# Patient Record
Sex: MALE | Race: ASIAN | NOT HISPANIC OR LATINO | Employment: FULL TIME | URBAN - METROPOLITAN AREA
[De-identification: names, ages, dates, MRNs, and addresses within clinical notes are randomized per-mention and may not be internally consistent; named-entity substitution may affect disease eponyms.]

---

## 2023-12-28 VITALS
RESPIRATION RATE: 16 BRPM | HEART RATE: 103 BPM | BODY MASS INDEX: 25.11 KG/M2 | DIASTOLIC BLOOD PRESSURE: 75 MMHG | TEMPERATURE: 98.8 F | WEIGHT: 160 LBS | OXYGEN SATURATION: 99 % | HEIGHT: 67 IN | SYSTOLIC BLOOD PRESSURE: 151 MMHG

## 2023-12-28 PROBLEM — E78.2 MIXED HYPERLIPIDEMIA: Status: ACTIVE | Noted: 2023-12-28

## 2023-12-28 PROBLEM — E55.9 VITAMIN D INSUFFICIENCY: Status: ACTIVE | Noted: 2020-08-04

## 2023-12-28 LAB
ANION GAP SERPL CALCULATED.3IONS-SCNC: 9 MMOL/L (ref 7–15)
BASOPHILS # BLD AUTO: 0.1 10E3/UL (ref 0–0.2)
BASOPHILS NFR BLD AUTO: 1 %
BUN SERPL-MCNC: 10.5 MG/DL (ref 6–20)
CALCIUM SERPL-MCNC: 10.1 MG/DL (ref 8.6–10)
CHLORIDE SERPL-SCNC: 100 MMOL/L (ref 98–107)
CREAT SERPL-MCNC: 1.19 MG/DL (ref 0.67–1.17)
CRP SERPL-MCNC: 55.3 MG/L
DEPRECATED HCO3 PLAS-SCNC: 27 MMOL/L (ref 22–29)
EGFRCR SERPLBLD CKD-EPI 2021: 81 ML/MIN/1.73M2
EOSINOPHIL # BLD AUTO: 0 10E3/UL (ref 0–0.7)
EOSINOPHIL NFR BLD AUTO: 0 %
ERYTHROCYTE [DISTWIDTH] IN BLOOD BY AUTOMATED COUNT: 12.9 % (ref 10–15)
GLUCOSE SERPL-MCNC: 116 MG/DL (ref 70–99)
HCT VFR BLD AUTO: 44 % (ref 40–53)
HGB BLD-MCNC: 15.1 G/DL (ref 13.3–17.7)
IMM GRANULOCYTES # BLD: 0.1 10E3/UL
IMM GRANULOCYTES NFR BLD: 0 %
LYMPHOCYTES # BLD AUTO: 2 10E3/UL (ref 0.8–5.3)
LYMPHOCYTES NFR BLD AUTO: 17 %
MCH RBC QN AUTO: 27.8 PG (ref 26.5–33)
MCHC RBC AUTO-ENTMCNC: 34.3 G/DL (ref 31.5–36.5)
MCV RBC AUTO: 81 FL (ref 78–100)
MONOCYTES # BLD AUTO: 1.2 10E3/UL (ref 0–1.3)
MONOCYTES NFR BLD AUTO: 10 %
NEUTROPHILS # BLD AUTO: 8.6 10E3/UL (ref 1.6–8.3)
NEUTROPHILS NFR BLD AUTO: 72 %
NRBC # BLD AUTO: 0 10E3/UL
NRBC BLD AUTO-RTO: 0 /100
PLATELET # BLD AUTO: 347 10E3/UL (ref 150–450)
POTASSIUM SERPL-SCNC: 4.1 MMOL/L (ref 3.4–5.3)
RBC # BLD AUTO: 5.44 10E6/UL (ref 4.4–5.9)
SODIUM SERPL-SCNC: 136 MMOL/L (ref 135–145)
TSH SERPL DL<=0.005 MIU/L-ACNC: 0.25 UIU/ML (ref 0.3–4.2)
WBC # BLD AUTO: 11.8 10E3/UL (ref 4–11)

## 2023-12-28 PROCEDURE — 86140 C-REACTIVE PROTEIN: CPT | Performed by: STUDENT IN AN ORGANIZED HEALTH CARE EDUCATION/TRAINING PROGRAM

## 2023-12-28 PROCEDURE — 36415 COLL VENOUS BLD VENIPUNCTURE: CPT | Performed by: STUDENT IN AN ORGANIZED HEALTH CARE EDUCATION/TRAINING PROGRAM

## 2023-12-28 PROCEDURE — 84439 ASSAY OF FREE THYROXINE: CPT | Performed by: STUDENT IN AN ORGANIZED HEALTH CARE EDUCATION/TRAINING PROGRAM

## 2023-12-28 PROCEDURE — 99284 EMERGENCY DEPT VISIT MOD MDM: CPT | Mod: 25

## 2023-12-28 PROCEDURE — 80048 BASIC METABOLIC PNL TOTAL CA: CPT | Performed by: STUDENT IN AN ORGANIZED HEALTH CARE EDUCATION/TRAINING PROGRAM

## 2023-12-28 PROCEDURE — 84443 ASSAY THYROID STIM HORMONE: CPT | Performed by: STUDENT IN AN ORGANIZED HEALTH CARE EDUCATION/TRAINING PROGRAM

## 2023-12-28 PROCEDURE — 86308 HETEROPHILE ANTIBODY SCREEN: CPT | Performed by: EMERGENCY MEDICINE

## 2023-12-28 PROCEDURE — 85025 COMPLETE CBC W/AUTO DIFF WBC: CPT | Performed by: STUDENT IN AN ORGANIZED HEALTH CARE EDUCATION/TRAINING PROGRAM

## 2023-12-29 ENCOUNTER — HOSPITAL ENCOUNTER (EMERGENCY)
Facility: CLINIC | Age: 38
Discharge: HOME | End: 2023-12-29
Attending: EMERGENCY MEDICINE | Admitting: EMERGENCY MEDICINE
Payer: COMMERCIAL

## 2023-12-29 ENCOUNTER — APPOINTMENT (OUTPATIENT)
Dept: ULTRASOUND IMAGING | Facility: CLINIC | Age: 38
End: 2023-12-29
Attending: EMERGENCY MEDICINE
Payer: COMMERCIAL

## 2023-12-29 DIAGNOSIS — E04.9 THYROID ENLARGEMENT: ICD-10-CM

## 2023-12-29 LAB
FLUAV RNA SPEC QL NAA+PROBE: NEGATIVE
FLUBV RNA RESP QL NAA+PROBE: NEGATIVE
MONOCYTES NFR BLD AUTO: NEGATIVE %
RSV RNA SPEC NAA+PROBE: NEGATIVE
SARS-COV-2 RNA RESP QL NAA+PROBE: NEGATIVE
T4 FREE SERPL-MCNC: 1.64 NG/DL (ref 0.9–1.7)

## 2023-12-29 PROCEDURE — 76536 US EXAM OF HEAD AND NECK: CPT

## 2023-12-29 PROCEDURE — 87637 SARSCOV2&INF A&B&RSV AMP PRB: CPT | Performed by: EMERGENCY MEDICINE

## 2023-12-29 NOTE — ED PROVIDER NOTES
EMERGENCY DEPARTMENT ENCOUNTER      NAME: Davide Delcid  AGE: 38 year old male  YOB: 1985  MRN: [id number]  EVALUATION DATE & TIME: No admission date for patient encounter.    PCP: Shonda Rincon    ED PROVIDER: Alberto Negrete M.D.      Chief Complaint   Patient presents with    Neck Pain                FINAL IMPRESSION:  1. Thyroid enlargement          ED COURSE & MEDICAL DECISION MAKING:    Pertinent Labs & Imaging studies reviewed below.  All EKGs below represent my independent interpretation.   ED Course as of 12/29/23 0634   Thu Dec 28, 2023   2346 At least 7 days of of left lump on neck. Now tender.  Temperature of 99.1 here with a blood pressure of 151, heart rate of 103.  He is comfortable appearing, has minimal tenderness over the area.  Denies any recent illness.  Sent in from urgent care after concern for infectious thyroiditis on hearing patient's story and gathering vitals.   Fri Dec 29, 2023   0135 Mononucleosis Screen: Negative   0220 US Thyroid  1.  Large heterogeneous thyroid gland. No abnormal mass or fluid collection in the neck.   Lab work here is quite reassuring, although his CRP is up at 55.3 and his white blood cell count is 11.8, his free T4 is normal at 1.64.  Negative viral panel including mononucleosis.    I spoke to endocrinology who recommends ultrasound of the thyroid.  This showed large heterogenous thyroid gland.  There was no lymphadenopathy or other fluid collection in the neck.  I suspect his symptoms are due to the monitoring thyroid, unclear if this is autoimmune or viral, but no indications for antibiotics at this time.  Endocrinology's recommendations will be for outpatient follow-up and I placed an urgent referral.  I also instructed patient to follow-up with his primary care doctor over the next week for recheck of lab work and symptoms if he cannot get food and to be seen by endocrinology within the next couple of weeks.  We discussed signs and  symptoms of prompt return to the emergency department.      Additional ED Course Timestamps:  12:03 AM I met with the patient to gather history and perform my exam. ED course and treatment discussed. Patient seen in [facility] due to critical capacity and boarding crisis leaving no ED rooms available.   2:29 AM I updated the patient with lab and imaging results and discussed the plan for discharge.       Medical Decision Making    History:  Supplemental history from: Documented in chart, if applicable  External Record(s) reviewed: Documented in chart, if applicable.    Work Up:  Chart documentation includes differential considered and any EKGs or imaging independently interpreted by provider, where specified.  In additional to work up documented, I considered the following work up: Documented in chart, if applicable.    External consultation:  Discussion of management with another provider: Documented in chart, if applicable    Complicating factors:  Care impacted by chronic illness: N/A  Care affected by social determinants of health: N/A    Disposition considerations: Discharge. No recommendations on prescription strength medication(s). N/A.        At the conclusion of the encounter I discussed the results of all of the tests and the disposition. The questions were answered. The patient or family acknowledged understanding and was agreeable with the care plan.         MEDICATIONS GIVEN IN THE EMERGENCY:  Medications - No data to display      NEW PRESCRIPTIONS STARTED AT TODAY'S ER VISIT  There are no discharge medications for this patient.         =================================================================    HPI        Davide Delcdi is a 38 year old male with a pertinent history of n/a who presents to this ED for evaluation of lump on the left side of his neck, sent from urgent care.  Symptoms have been present for about 7 days, more noticeable today and a little bit of swelling noted to the right side of  "the lower neck.  No fever above 100, but slight elevation at 99 earlier today.  No unexpected weight loss.  No painful swallowing.               VITALS:  BP (!) 151/75   Pulse 103   Temp 98.8  F (37.1  C) (Oral)   Resp 16   Ht 1.702 m (5' 7\")   Wt 72.6 kg (160 lb)   SpO2 99%   BMI 25.06 kg/m      PHYSICAL EXAM    Constitutional: Well developed, well nourished. Comfortable appearing.  HENT: Normocephalic, atraumatic, mucous membranes moist, nose normal. Neck- mild tenderness to bump on left side of the neck.  Managing secretions.   Eyes: Pupils mid-range, conjunctiva without injection, no discharge.   Respiratory: Clear to auscultation bilaterally, no respiratory distress, no wheezing, speaks full sentences easily. No cough.  Cardiovascular: mildly tachycardic rate, regular rhythm, no murmurs.   GI: Soft, no tenderness to deep palpation in all quadrants, no masses.  Musculoskeletal: Moving all 4 extremities intentionally and without pain. No obvious deformity.  Skin: Warm, dry, no rash.  Neurologic: Alert & oriented x 3, cranial nerves grossly intact.  Psychiatric: Affect normal, cooperative.      I, Criss Amin am serving as a scribe to document services personally performed by Dr. Alberto Negrete based on my observation and the provider's statements to me. I, Alberto Negrete MD attest that Criss Amin is acting in a scribe capacity, has observed my performance of the services and has documented them in accordance with my direction.    Alberto Negrete M.D.  Emergency Medicine  [facility] EMERGENCY ROOM  [address] [address]  [phone]  Dept: [phone]       Alberto Negrete MD  12/29/23 0641    "

## 2023-12-29 NOTE — PROGRESS NOTES
Informal Recommendations Note    I was called by Dr. crane on 12/28/23 at 00:05  AM to provide input for Davide Delcid. The nature of this request for input does not permit comprehensive review of health records or patient interview.  I was not requested or am not able to personally examine the patient at this time.    Davide is a 38 year old male who presented to the emergency department for symptoms of mild neck pain and lump on his neck.  He was referred from PCP clinic for concerns of infectious thyroiditis.  In the emergency department patient's lab work showed TSH suppressed at 0.25 with normal free T4 of 1.64.  Also had elevated CRP at 55.30.  He was mildly tachycardic at 103.  No other hyperthyroid symptoms reported from ED signout.    Based on the information provided, my recommendations are as follows:    Discussed with ED provider.  My differential diagnoses are subacute thyroiditis (viral/idiopathic) versus infectious thyroiditis.  -Recommended getting ultrasound thyroid to rule out any thyroid abscess.  -Recommended NSAIDs for pain control.  If ultrasound negative for thyroid ultrasound shows features for thyroiditis, can consider prednisone as outpatient.  -TSH is suppressed and free T4 is currently normal but can progress to overt hyperthyroidism.  Recommended follow-up thyroid labs with PCP in a week.  Also recommended placing urgent endocrinology referral for further evaluation including radioactive iodine uptake scan and checking for TPO and TSI antibodies.  -Recommended using beta-blocker for heart rate control if needed.    These recommendations are not intended to take the place of the care team's clinical judgement, which should always be utilized to provide the most appropriate care to meet the unique needs of each patient.     Carlos Panchal MD   PGY-4  Endocrinology fellow

## 2023-12-29 NOTE — DISCHARGE INSTRUCTIONS
Thyroid gland is enlarged, but no sign of a abscess or bacterial infection.  Your thyroid levels are still normal, but it is important for follow-up with an endocrinologist.  They can recheck levels in clinic.  I placed a referral and somebody will call you within the next few business days to set up an appointment.    It may take more than a week to be seen by endocrinology, and I like you to be seen by your primary care doctor next week for reevaluation.  They can recheck your thyroid tests, check your vital signs and see how you are feeling.

## 2024-01-03 ENCOUNTER — TELEPHONE (OUTPATIENT)
Dept: ENDOCRINOLOGY | Facility: CLINIC | Age: 39
End: 2024-01-03
Payer: COMMERCIAL

## 2024-01-05 NOTE — ED TRIAGE NOTES
Patient reports swollen lump on left side of neck x 3-4 days. Reports pain with palpation. Low grade fever. Patient was seen at  and advised to come to ED for evaluation of possible acute thyroiditis      Triage Assessment (Adult)       Row Name 12/28/23 9566          Triage Assessment    Airway WDL WDL        Respiratory WDL    Respiratory WDL WDL        Skin Circulation/Temperature WDL    Skin Circulation/Temperature WDL WDL        Cardiac WDL    Cardiac WDL WDL        Peripheral/Neurovascular WDL    Peripheral Neurovascular WDL WDL        Cognitive/Neuro/Behavioral WDL    Cognitive/Neuro/Behavioral WDL WDL                     
General

## 2024-01-12 ENCOUNTER — VIRTUAL VISIT (OUTPATIENT)
Dept: ENDOCRINOLOGY | Facility: CLINIC | Age: 39
End: 2024-01-12
Attending: EMERGENCY MEDICINE
Payer: COMMERCIAL

## 2024-01-12 DIAGNOSIS — R73.09 ELEVATED GLUCOSE: ICD-10-CM

## 2024-01-12 DIAGNOSIS — E05.90 HYPERTHYROIDISM: ICD-10-CM

## 2024-01-12 DIAGNOSIS — R79.89 LOW VITAMIN D LEVEL: Primary | ICD-10-CM

## 2024-01-12 DIAGNOSIS — E04.9 THYROID ENLARGEMENT: ICD-10-CM

## 2024-01-12 PROCEDURE — 99204 OFFICE O/P NEW MOD 45 MIN: CPT | Mod: 95 | Performed by: INTERNAL MEDICINE

## 2024-01-12 RX ORDER — OMEGA-3 FATTY ACIDS/FISH OIL 300-1000MG
200 CAPSULE ORAL EVERY 6 HOURS
COMMUNITY
End: 2024-03-01

## 2024-01-12 RX ORDER — AMPICILLIN TRIHYDRATE 500 MG
1000 CAPSULE ORAL
COMMUNITY
Start: 2022-12-01 | End: 2024-03-01

## 2024-01-12 ASSESSMENT — PAIN SCALES - GENERAL: PAINLEVEL: NO PAIN (0)

## 2024-01-12 NOTE — LETTER
1/12/2024         RE: Davide Delcid  [address] [place] [address]        Dear Colleague,    Thank you for referring your patient, Davide Delcid, to the [facility] [place]. Please see a copy of my visit note below.    Video-Visit Details    Type of service:  Video Visit  Video Start Time: 1304  Video End Time:1356  Originating Location (pt. Location): Home, [place]  Distant Location (provider location):  Home  Platform used for Video Visit: Mckenzie Peres MD    Endocrinology Clinic Visit 1/12/2024    NAME:  Davide Delcid  PCP:  Shonda Rincon  MRN:  [id number]  Reason for Consult:  thyrotoxicosis  Requesting Provider:  Alberto Negrete       HISTORY OF PRESENT ILLNESS  Davide Delcid (Lucille) is a 38 year old male who is here for initial evaluation and management of thyrotoxicosis    Patient presented to the ED and endocrine fellow was contacted 12/29/23.  Per Dr. Gonzalez note   In the emergency department patient's lab work showed TSH suppressed at 0.25 with normal free T4 of 1.64.  Also had elevated CRP at 55.30.  He was mildly tachycardic at 103.  No other hyperthyroid symptoms reported from ED signout.     Based on the information provided, my recommendations are as follows:    Discussed with ED provider.  My differential diagnoses are subacute thyroiditis (viral/idiopathic) versus infectious thyroiditis.  -Recommended getting ultrasound thyroid to rule out any thyroid abscess.  -Recommended NSAIDs for pain control.  If ultrasound negative for thyroid ultrasound shows features for thyroiditis, can consider prednisone as outpatient.  -TSH is suppressed and free T4 is currently normal but can progress to overt hyperthyroidism.  Recommended follow-up thyroid labs with PCP in a week.  Also recommended placing urgent endocrinology referral for further evaluation including radioactive iodine uptake scan and checking for TPO and TSI  antibodies.  -Recommended using beta-blocker for heart rate control if needed.    PCP 1/9/23  On exam: Tender palpable and visible thyroid lobes - not nodular     Pt reported  Uncle with hypothyroid   Parents with high BP    Pt has intermittent sinusitis  Left sided neck pain, around before Xmas, then progress to the right and fever, so went to the ED  Difficulty sleep  Currently pain subsided, only tender to touch 3/10. Fever improved  Notice more swelling after paying attention  No problem swallowing/ breathing/ voice change  More sensitive to hot and cold  Shivering, now is gone    Ongoing weight loss 10 lbs and mood swing,   BM is ok  No eye pain or vision change    BP normal  HR 80s-90s (normal 50s-60s), and >100 with exertion      REVIEW OF SYSTEMS  10 point negative except as mentioned in HPI    Past Medical/Surgical History:  none    Medications  Current Outpatient Medications   Medication     Acetaminophen 325 MG CAPS     Cholecalciferol (D 1000) 25 MCG (1000 UT) CAPS     ibuprofen (ADVIL/MOTRIN) 200 MG capsule     No current facility-administered medications for this visit.       Allergies  [place] tree (sneezing)      Family History  family history is not on file.    Social History  Social History     Tobacco Use     Smoking status: Never     Smokeless tobacco: Never   Substance Use Topics     Alcohol use: Not on file       Physical Exam  There were no vitals taken for this visit.  There is no height or weight on file to calculate BMI.  GENERAL :  In no apparent distress  EYES: No obvious proptosis  NECK: No obvious visible masses.   RESP: Normal breathing  NEURO: awake, alert, responds appropriately to questions.      DATA REVIEW  Labs/Imaging  TSH   Date Value Ref Range Status   12/28/2023 0.25 (L) 0.30 - 4.20 uIU/mL Final     Free T4   Date Value Ref Range Status   12/28/2023 1.64 0.90 - 1.70 ng/dL Final       I personally reviewed image.   EXAM: US THYROID  LOCATION: [facility]  DATE: 12/29/2023     INDICATION: L>R neck swelling, eval for mass  COMPARISON: None.  TECHNIQUE: Thyroid ultrasound.      FINDINGS:  RIGHT lobe: 5.6 x 2.0 x 2.0 cm. Heterogenous echotexture.  Isthmus: 1 mm.  LEFT lobe: 5.1 x 2.0 x 2.0 cm. Heterogenous echotexture.     NECK: No cervical lymphadenopathy.                                                                      IMPRESSION:  1.  Large heterogeneous thyroid gland. No abnormal mass or fluid collection in the neck.      ASSESSMENT/PLAN:   ## Thyrotoxicosis  Base on improvement in clinical, most likely thyroiditis (subacute thyroiditis).  Today we discussed natural history of thyroiditis, starts with thyrotoxicosis, resolves to normal thyroid function within 12-18 months, 5% possibility  of permanent hypothyroidism.   We discussed betablocker for symptomatic treatment (palpitation/ high heart rate/ shakiness/ irritability), patient prefers continue to monitor for now.  Discussed possible use of cholestyramine if ongoing thyrotoxicosis.  NSAIDS as needed for pain  -- labs  -- possible cholestyramine  -- 6 weeks follow up    Orders Placed This Encounter   Procedures     TSH with free T4 reflex     Thyroid stimulating immunoglobulin     Thyrotropin Receptor Antibody     Vitamin D Deficiency     Hemoglobin A1c          Quan Peres MD        Again, thank you for allowing me to participate in the care of your patient.        Sincerely,        Quan Peres MD

## 2024-01-12 NOTE — PROGRESS NOTES
"Virtual Visit Details    Type of service:  Video Visit     Originating Location (pt. Location): {video visit patient location:690667::\"Home\"}  {PROVIDER LOCATION On-site should be selected for visits conducted from your clinic location or adjoining [facility] hospital, academic office, or other nearby [facility] building. Off-site should be selected for all other provider locations, including home:[id number]}  Distant Location (provider location):  {virtual location provider:822460}  Platform used for Video Visit: {Virtual Visit Platforms:043636::\"shipbeat\"}  "

## 2024-01-12 NOTE — NURSING NOTE
Is the patient currently in the state of [place]? YES    Visit mode:VIDEO    If the visit is dropped, the patient can be reconnected by: VIDEO VISIT: Send to e-mail at: [email or web address]    Will anyone else be joining the visit? NO  (If patient encounters technical issues they should call [phone])    How would you like to obtain your AVS? MyChart    Are changes needed to the allergy or medication list? No    Reason for visit: Consult    Shelby Kocher VVF

## 2024-01-12 NOTE — PROGRESS NOTES
Video-Visit Details    Type of service:  Video Visit  Video Start Time: 1304  Video End Time:1356  Originating Location (pt. Location): [place], [place]  Distant Location (provider location):  Home  Platform used for Video Visit: Mckenzie Peres MD    Endocrinology Clinic Visit 1/12/2024    NAME:  Davide Delcid  PCP:  Shonda Rincon  MRN:  [id number]  Reason for Consult:  thyrotoxicosis  Requesting Provider:  Alberto Negrete       HISTORY OF PRESENT ILLNESS  Davide Delcid (Lucille) is a 38 year old male who is here for initial evaluation and management of thyrotoxicosis    Patient presented to the ED and endocrine fellow was contacted 12/29/23.  Per Dr. Gonzalez note   In the emergency department patient's lab work showed TSH suppressed at 0.25 with normal free T4 of 1.64.  Also had elevated CRP at 55.30.  He was mildly tachycardic at 103.  No other hyperthyroid symptoms reported from ED signout.     Based on the information provided, my recommendations are as follows:    Discussed with ED provider.  My differential diagnoses are subacute thyroiditis (viral/idiopathic) versus infectious thyroiditis.  -Recommended getting ultrasound thyroid to rule out any thyroid abscess.  -Recommended NSAIDs for pain control.  If ultrasound negative for thyroid ultrasound shows features for thyroiditis, can consider prednisone as outpatient.  -TSH is suppressed and free T4 is currently normal but can progress to overt hyperthyroidism.  Recommended follow-up thyroid labs with PCP in a week.  Also recommended placing urgent endocrinology referral for further evaluation including radioactive iodine uptake scan and checking for TPO and TSI antibodies.  -Recommended using beta-blocker for heart rate control if needed.    PCP 1/9/23  On exam: Tender palpable and visible thyroid lobes - not nodular     Pt reported  Uncle with hypothyroid   Parents with high BP    Pt has intermittent sinusitis  Left sided neck pain, around  before Xmas, then progress to the right and fever, so went to the ED  Difficulty sleep  Currently pain subsided, only tender to touch 3/10. Fever improved  Notice more swelling after paying attention  No problem swallowing/ breathing/ voice change  More sensitive to hot and cold  Shivering, now is gone    Ongoing weight loss 10 lbs and mood swing,   BM is ok  No eye pain or vision change    BP normal  HR 80s-90s (normal 50s-60s), and >100 with exertion      REVIEW OF SYSTEMS  10 point negative except as mentioned in HPI    Past Medical/Surgical History:  none    Medications  Current Outpatient Medications   Medication    Acetaminophen 325 MG CAPS    Cholecalciferol (D 1000) 25 MCG (1000 UT) CAPS    ibuprofen (ADVIL/MOTRIN) 200 MG capsule     No current facility-administered medications for this visit.       Allergies  [place] tree (sneezing)      Family History  family history is not on file.    Social History  Social History     Tobacco Use    Smoking status: Never    Smokeless tobacco: Never   Substance Use Topics    Alcohol use: Not on file       Physical Exam  There were no vitals taken for this visit.  There is no height or weight on file to calculate BMI.  GENERAL :  In no apparent distress  EYES: No obvious proptosis  NECK: No obvious visible masses.   RESP: Normal breathing  NEURO: awake, alert, responds appropriately to questions.      DATA REVIEW  Labs/Imaging  TSH   Date Value Ref Range Status   12/28/2023 0.25 (L) 0.30 - 4.20 uIU/mL Final     Free T4   Date Value Ref Range Status   12/28/2023 1.64 0.90 - 1.70 ng/dL Final       I personally reviewed image.   EXAM: US THYROID  LOCATION: [facility]  DATE: 12/29/2023     INDICATION: L>R neck swelling, eval for mass  COMPARISON: None.  TECHNIQUE: Thyroid ultrasound.      FINDINGS:  RIGHT lobe: 5.6 x 2.0 x 2.0 cm. Heterogenous echotexture.  Isthmus: 1 mm.  LEFT lobe: 5.1 x 2.0 x 2.0 cm. Heterogenous echotexture.     NECK: No cervical  lymphadenopathy.                                                                      IMPRESSION:  1.  Large heterogeneous thyroid gland. No abnormal mass or fluid collection in the neck.      ASSESSMENT/PLAN:   ## Thyrotoxicosis  Base on improvement in clinical, most likely thyroiditis (subacute thyroiditis).  Today we discussed natural history of thyroiditis, starts with thyrotoxicosis, resolves to normal thyroid function within 12-18 months, 5% possibility  of permanent hypothyroidism.   We discussed betablocker for symptomatic treatment (palpitation/ high heart rate/ shakiness/ irritability), patient prefers continue to monitor for now.  Discussed possible use of cholestyramine if ongoing thyrotoxicosis.  NSAIDS as needed for pain  -- labs  -- possible cholestyramine  -- 6 weeks follow up    Orders Placed This Encounter   Procedures    TSH with free T4 reflex    Thyroid stimulating immunoglobulin    Thyrotropin Receptor Antibody    Vitamin D Deficiency    Hemoglobin A1c          Quan Peres MD      01/31/24   Please let patient know that labs for Graves' antibodies are normal.  Recommend further evaluation of hyperthyroid with thyroid uptake scan.    Please schedule a sooner follow up 2-4 weeks.

## 2024-01-20 ENCOUNTER — LAB (OUTPATIENT)
Dept: LAB | Facility: CLINIC | Age: 39
End: 2024-01-20
Payer: COMMERCIAL

## 2024-01-20 DIAGNOSIS — E04.9 THYROID ENLARGEMENT: ICD-10-CM

## 2024-01-20 DIAGNOSIS — R73.09 ELEVATED GLUCOSE: ICD-10-CM

## 2024-01-20 DIAGNOSIS — R79.89 LOW VITAMIN D LEVEL: ICD-10-CM

## 2024-01-20 LAB
HBA1C MFR BLD: 5.7 % (ref 0–5.6)
T4 FREE SERPL-MCNC: 4.72 NG/DL (ref 0.9–1.7)
TSH SERPL DL<=0.005 MIU/L-ACNC: <0.01 UIU/ML (ref 0.3–4.2)
VIT D+METAB SERPL-MCNC: 21 NG/ML (ref 20–50)

## 2024-01-20 PROCEDURE — 83520 IMMUNOASSAY QUANT NOS NONAB: CPT | Mod: 90

## 2024-01-20 PROCEDURE — 82306 VITAMIN D 25 HYDROXY: CPT

## 2024-01-20 PROCEDURE — 99000 SPECIMEN HANDLING OFFICE-LAB: CPT

## 2024-01-20 PROCEDURE — 84439 ASSAY OF FREE THYROXINE: CPT

## 2024-01-20 PROCEDURE — 84445 ASSAY OF TSI GLOBULIN: CPT | Mod: 90

## 2024-01-20 PROCEDURE — 84443 ASSAY THYROID STIM HORMONE: CPT

## 2024-01-20 PROCEDURE — 83036 HEMOGLOBIN GLYCOSYLATED A1C: CPT

## 2024-01-20 PROCEDURE — 36415 COLL VENOUS BLD VENIPUNCTURE: CPT

## 2024-01-22 ENCOUNTER — MYC MEDICAL ADVICE (OUTPATIENT)
Dept: ENDOCRINOLOGY | Facility: CLINIC | Age: 39
End: 2024-01-22
Payer: COMMERCIAL

## 2024-01-22 DIAGNOSIS — E05.90 HYPERTHYROIDISM: Primary | ICD-10-CM

## 2024-01-22 LAB — TSH RECEP AB SER-ACNC: <1.1 IU/L (ref 0–1.75)

## 2024-01-22 RX ORDER — CHOLESTYRAMINE 4 G/9G
1 POWDER, FOR SUSPENSION ORAL
Qty: 90 PACKET | Refills: 0 | Status: SHIPPED | OUTPATIENT
Start: 2024-01-22 | End: 2024-03-01

## 2024-01-22 NOTE — ADDENDUM NOTE
"Dr. Merrill  Please review lab results drawn on 5/19/2022.  Pt message via [employer] with concern to results.  Do you recommend any f/u to this patient?    \"Hello!  I just got my test results. The numbers are high or very high. I m very worry about that. Many people on my dad s side passed away because of the stroke. And almost all people in my family have high blood pressure. I m wondering that it is genetics in my family?   I m trying to walk about 15 minutes a day. If I change my diet and do more exercise, will it help? I heard people said there is medication to reduce the cholesterol. Do I need to use it?      Thank you,      Timothy Crowell\"     Thank you    MIKE Singh, RN  [facility]    " Addended by: FRANCIS EDOUARD on: 1/22/2024 08:40 AM     Modules accepted: Orders

## 2024-01-22 NOTE — RESULT ENCOUNTER NOTE
Hello -    Here are my comments about the recent results. Thyroid function test shows elevated free T4. I sent cholestyramine (medication to help lower thyroid hormone level).  Graves' antibodies are pending.    A1c in prediabetes range, continue healthy lifestyle and will recheck in 3 months.    Please let us know if you have any questions or concerns.    Regards,  Quan Peres MD

## 2024-01-24 NOTE — RESULT ENCOUNTER NOTE
Hello -    Here are my comments about the recent results. One of two Graves' antibodies is normal. Pending TSI.    Please let us know if you have any questions or concerns.    Regards,  Quan Peres MD

## 2024-01-26 LAB — TSI SER-ACNC: <1 TSI INDEX

## 2024-01-31 NOTE — TELEPHONE ENCOUNTER
----- Message from Quan Peres MD sent at 1/31/2024  8:52 AM CST -----  Please let patient know that labs for Graves' antibodies are normal.  Recommend further evaluation of hyperthyroid with thyroid uptake scan.    Please schedule a sooner follow up 2-4 weeks.

## 2024-01-31 NOTE — RESULT ENCOUNTER NOTE
Please let patient know that labs for Graves' antibodies are normal.  Recommend further evaluation of hyperthyroid with thyroid uptake scan.    Please schedule a sooner follow up 2-4 weeks.

## 2024-02-27 NOTE — PROGRESS NOTES
Video-Visit Details    Type of service:  Video Visit  Video Start Time: 1428  Video End Time: 1439  Originating Location (pt. Location): Home, [place]  Distant Location (provider location):  Home  Platform used for Video Visit: Mckenzie Peres MD    Interval History  Weight is up 77 kg -->69-->73 kg  Overall feeling good  Pt has not had thyroid uptake and scan  Pt took cholestyramine about 3-4 times and stopped  Dizziness when change position  Never passed out    Initial visit  Patient presented to the ED and endocrine fellow was contacted 12/29/23.  Per Dr. Gonzalez note   In the emergency department patient's lab work showed TSH suppressed at 0.25 with normal free T4 of 1.64.  Also had elevated CRP at 55.30.  He was mildly tachycardic at 103.  No other hyperthyroid symptoms reported from ED signout.     Based on the information provided, my recommendations are as follows:    Discussed with ED provider.  My differential diagnoses are subacute thyroiditis (viral/idiopathic) versus infectious thyroiditis.  -Recommended getting ultrasound thyroid to rule out any thyroid abscess.  -Recommended NSAIDs for pain control.  If ultrasound negative for thyroid ultrasound shows features for thyroiditis, can consider prednisone as outpatient.  -TSH is suppressed and free T4 is currently normal but can progress to overt hyperthyroidism.  Recommended follow-up thyroid labs with PCP in a week.  Also recommended placing urgent endocrinology referral for further evaluation including radioactive iodine uptake scan and checking for TPO and TSI antibodies.  -Recommended using beta-blocker for heart rate control if needed.    PCP 1/9/23  On exam: Tender palpable and visible thyroid lobes - not nodular     Pt reported  Uncle with hypothyroid   Parents with high BP    Pt has intermittent sinusitis  Left sided neck pain, around before Xmas, then progress to the right and fever, so went to the ED  Difficulty sleep  Currently pain  subsided, only tender to touch 3/10. Fever improved  Notice more swelling after paying attention  No problem swallowing/ breathing/ voice change  More sensitive to hot and cold  Shivering, now is gone    Ongoing weight loss 10 lbs and mood swing,   BM is ok  No eye pain or vision change    BP normal  HR 80s-90s (normal 50s-60s), and >100 with exertion      REVIEW OF SYSTEMS  10 point negative except as mentioned in HPI    Past Medical/Surgical History:  none    Medications  No current outpatient medications on file.     No current facility-administered medications for this visit.       Allergies  [place] tree (sneezing)      Family History  family history is not on file.    Social History  Social History     Tobacco Use    Smoking status: Never    Smokeless tobacco: Never   Substance Use Topics    Alcohol use: Not on file       Physical Exam  There were no vitals taken for this visit.  There is no height or weight on file to calculate BMI.  GENERAL :  In no apparent distress  NEURO: awake, alert, responds appropriately to questions.        EXAM: US THYROID  LOCATION: [facility]  DATE: 12/29/2023     INDICATION: L>R neck swelling, eval for mass  COMPARISON: None.  TECHNIQUE: Thyroid ultrasound.      FINDINGS:  RIGHT lobe: 5.6 x 2.0 x 2.0 cm. Heterogenous echotexture.  Isthmus: 1 mm.  LEFT lobe: 5.1 x 2.0 x 2.0 cm. Heterogenous echotexture.     NECK: No cervical lymphadenopathy.                                                                      IMPRESSION:  1.  Large heterogeneous thyroid gland. No abnormal mass or fluid collection in the neck.       DATA REVIEW  Labs/Imaging  2/26/24  TSH 7.41  FT4 0.78 (0.93-1.7)     Latest Reference Range & Units 01/20/24 11:33   Thyrotropin Receptor Antibody 0.00 - 1.75 IU/L <1.10      Latest Reference Range & Units 01/20/24 11:33   Thyroid Stim Immunog <=1.3 TSI index <1.0       ASSESSMENT/PLAN:   ## Thyrotoxicosis--> hypothyroidism  ## Mild  heterogenous and enlarge thyroid  Base on improvement in clinical, US and lab pattern from overactive to underactive thyroid, most likely thyroiditis (subacute thyroiditis).  Ok to hold off on thyroid uptake and scan for now.  Thyroid US showed mildly enlarge thyroid and signs of thyroiditis (heterogenous)  Overall feeling well, we discuss chances of full recovery vs ongoing hypothyroidism  -- labs in 2 weeks at [facility]. Patient would like to recheck triglyceride, will do fasting lipid profile  -- regarding activities, hypothyroid can increase risk of muscle inflammation (myositis), recommend avoid vigorous exercise and only do light-mod exercise for now until thyroid function test is normal  -- Pre DM continue to follow up with primary    Follow-up to be determined    Orders Placed This Encounter   Procedures    TSH with free T4 reflex    Lipid Profile (Chol, Trig, HDL, LDL calc)    Thyroid peroxidase antibody     06/11/24 5/23/24 12.23  TSH 3.0 (0.27-4.2)  TPO <9    Total cholesterol 240  Triglyceride 684  Thyroid function test is normal, transient abnormal thyroid function test is is most likely from thyroiditis (inflammation of the thyroid).  If feeling well, from thyroid standpoint, can follow-up and check with your primary yearly.  Lipid profile shows elevated triglyceride and total cholesterol, recommend recheck fasting lab with your primary.  In general, mild hyperlipidemia can be managed with primary care, if recheck and significantly elevated, please let me know and we can set up a follow up.       Quan Peres MD

## 2024-03-01 ENCOUNTER — VIRTUAL VISIT (OUTPATIENT)
Dept: ENDOCRINOLOGY | Facility: CLINIC | Age: 39
End: 2024-03-01
Payer: COMMERCIAL

## 2024-03-01 DIAGNOSIS — E06.9 THYROIDITIS: Primary | ICD-10-CM

## 2024-03-01 DIAGNOSIS — E03.8 OTHER SPECIFIED HYPOTHYROIDISM: ICD-10-CM

## 2024-03-01 PROCEDURE — 99213 OFFICE O/P EST LOW 20 MIN: CPT | Mod: 95 | Performed by: INTERNAL MEDICINE

## 2024-03-01 NOTE — NURSING NOTE
Is the patient currently in the state of [place]? YES    Visit mode:VIDEO    If the visit is dropped, the patient can be reconnected by: VIDEO VISIT: Send to e-mail at: [email or web address]    Will anyone else be joining the visit? NO  (If patient encounters technical issues they should call [phone]    How would you like to obtain your AVS? MyChart    Are changes needed to the allergy or medication list? Yes patient reported taking 5,000 international unit(s) of vitamin sometimes.    Reason for visit: RECHECK    Gin TRAN

## 2024-03-01 NOTE — LETTER
3/1/2024         RE: Davide Delcid  [address] [place] [facility] [address]        Dear Colleague,    Thank you for referring your patient, Davide Delcid, to the [facility] [place]. Please see a copy of my visit note below.    Video-Visit Details    Type of service:  Video Visit  Video Start Time: 1428  Video End Time: 1439  Originating Location (pt. Location): [place], [place]  Distant Location (provider location):  Home  Platform used for Video Visit: Mckenzie Peres MD    Interval History  Weight is up 77 kg -->69-->73 kg  Overall feeling good  Pt has not had thyroid uptake and scan  Pt took cholestyramine about 3-4 times and stopped  Dizziness when change position  Never passed out    Initial visit  Patient presented to the ED and endocrine fellow was contacted 12/29/23.  Per Dr. Gonzalez note   In the emergency department patient's lab work showed TSH suppressed at 0.25 with normal free T4 of 1.64.  Also had elevated CRP at 55.30.  He was mildly tachycardic at 103.  No other hyperthyroid symptoms reported from ED signout.     Based on the information provided, my recommendations are as follows:    Discussed with ED provider.  My differential diagnoses are subacute thyroiditis (viral/idiopathic) versus infectious thyroiditis.  -Recommended getting ultrasound thyroid to rule out any thyroid abscess.  -Recommended NSAIDs for pain control.  If ultrasound negative for thyroid ultrasound shows features for thyroiditis, can consider prednisone as outpatient.  -TSH is suppressed and free T4 is currently normal but can progress to overt hyperthyroidism.  Recommended follow-up thyroid labs with PCP in a week.  Also recommended placing urgent endocrinology referral for further evaluation including radioactive iodine uptake scan and checking for TPO and TSI antibodies.  -Recommended using beta-blocker for heart rate control if needed.    PCP 1/9/23  On exam: Tender palpable and  visible thyroid lobes - not nodular     Pt reported  Uncle with hypothyroid   Parents with high BP    Pt has intermittent sinusitis  Left sided neck pain, around before Xmas, then progress to the right and fever, so went to the ED  Difficulty sleep  Currently pain subsided, only tender to touch 3/10. Fever improved  Notice more swelling after paying attention  No problem swallowing/ breathing/ voice change  More sensitive to hot and cold  Shivering, now is gone    Ongoing weight loss 10 lbs and mood swing,   BM is ok  No eye pain or vision change    BP normal  HR 80s-90s (normal 50s-60s), and >100 with exertion      REVIEW OF SYSTEMS  10 point negative except as mentioned in HPI    Past Medical/Surgical History:  none    Medications  No current outpatient medications on file.     No current facility-administered medications for this visit.       Allergies  [place] tree (sneezing)      Family History  family history is not on file.    Social History  Social History     Tobacco Use     Smoking status: Never     Smokeless tobacco: Never   Substance Use Topics     Alcohol use: Not on file       Physical Exam  There were no vitals taken for this visit.  There is no height or weight on file to calculate BMI.  GENERAL :  In no apparent distress  NEURO: awake, alert, responds appropriately to questions.        EXAM: US THYROID  LOCATION: [facility]  DATE: 12/29/2023     INDICATION: L>R neck swelling, eval for mass  COMPARISON: None.  TECHNIQUE: Thyroid ultrasound.      FINDINGS:  RIGHT lobe: 5.6 x 2.0 x 2.0 cm. Heterogenous echotexture.  Isthmus: 1 mm.  LEFT lobe: 5.1 x 2.0 x 2.0 cm. Heterogenous echotexture.     NECK: No cervical lymphadenopathy.                                                                      IMPRESSION:  1.  Large heterogeneous thyroid gland. No abnormal mass or fluid collection in the neck.       DATA REVIEW  Labs/Imaging  2/26/24  TSH 7.41  FT4 0.78 (0.93-1.7)     Latest  Reference Range & Units 01/20/24 11:33   Thyrotropin Receptor Antibody 0.00 - 1.75 IU/L <1.10      Latest Reference Range & Units 01/20/24 11:33   Thyroid Stim Immunog <=1.3 TSI index <1.0       ASSESSMENT/PLAN:   ## Thyrotoxicosis--> hypothyroidism  ## Mild heterogenous and enlarge thyroid  Base on improvement in clinical, US and lab pattern from overactive to underactive thyroid, most likely thyroiditis (subacute thyroiditis).  Ok to hold off on thyroid uptake and scan for now.  Thyroid US showed mildly enlarge thyroid and signs of thyroiditis (heterogenous)  Overall feeling well, we discuss chances of full recovery vs ongoing hypothyroidism  -- labs in 2 weeks at [facility]. Patient would like to recheck triglyceride, will do fasting lipid profile  -- regarding activities, hypothyroid can increase risk of muscle inflammation (myositis), recommend avoid vigorous exercise and only do light-mod exercise for now until thyroid function test is normal  -- Pre DM continue to follow up with primary    Follow-up to be determined    Orders Placed This Encounter   Procedures     TSH with free T4 reflex     Lipid Profile (Chol, Trig, HDL, LDL calc)     Thyroid peroxidase antibody          Quan Peres MD        Again, thank you for allowing me to participate in the care of your patient.        Sincerely,        Quan Peres MD

## 2024-03-10 ENCOUNTER — HEALTH MAINTENANCE LETTER (OUTPATIENT)
Age: 39
End: 2024-03-10

## 2025-03-16 ENCOUNTER — HEALTH MAINTENANCE LETTER (OUTPATIENT)
Age: 40
End: 2025-03-16